# Patient Record
Sex: FEMALE
[De-identification: names, ages, dates, MRNs, and addresses within clinical notes are randomized per-mention and may not be internally consistent; named-entity substitution may affect disease eponyms.]

---

## 2020-02-11 NOTE — PDOC.LDHP
Labor and Delivery H&P


HPI: 


30 y/o  at 40 and 1/7 weeks presents for TOLAC with pitocin induction. R

/B/A of TOLAC carefully discussed in clinic on multiple occasions, including on 

20 in detail.





Current gestational age (weeks): 40


Due date: 20


Grav: 7


Para: 2


Current pregnancy complications: none


Abnormal US findings: No


Current medications: pre-sayda vitamins


Previous surgical history: low tranverse CS


Social history: none





- Physical Exam


Vital signs reviewed and normal: yes


General: NAD


Heart: RRR


Lungs: CTAB


Abdomen: NTTP


Extremeties: no edema


FHT: category 1





- Assessment


L&D Assessment: medically indicated induction





- Plan


Plan: admit to L&D, cervical ripening

## 2020-02-12 ENCOUNTER — HOSPITAL ENCOUNTER (INPATIENT)
Dept: HOSPITAL 92 - L&D | Age: 30
LOS: 2 days | Discharge: HOME | End: 2020-02-14
Attending: OBSTETRICS & GYNECOLOGY | Admitting: OBSTETRICS & GYNECOLOGY
Payer: COMMERCIAL

## 2020-02-12 VITALS — BODY MASS INDEX: 35.4 KG/M2

## 2020-02-12 DIAGNOSIS — Z3A.40: ICD-10-CM

## 2020-02-12 LAB
DRUG SCREEN CUTOFF: (no result)
HBSAG INDEX: 0.21 S/CO (ref 0–0.99)
HGB BLD-MCNC: 12.7 G/DL (ref 12–16)
MCH RBC QN AUTO: 32.2 PG (ref 27–31)
MCV RBC AUTO: 90.8 FL (ref 78–98)
MEDTOX CONTROL LINE VALID?: (no result)
MEDTOX READER #: (no result)
PLATELET # BLD AUTO: 196 THOU/UL (ref 130–400)
RBC # BLD AUTO: 3.94 MILL/UL (ref 4.2–5.4)
SYPHILIS ANTIBODY INDEX: 0.03 S/CO
WBC # BLD AUTO: 9.6 THOU/UL (ref 4.8–10.8)

## 2020-02-12 PROCEDURE — 87340 HEPATITIS B SURFACE AG IA: CPT

## 2020-02-12 PROCEDURE — 86900 BLOOD TYPING SEROLOGIC ABO: CPT

## 2020-02-12 PROCEDURE — 86780 TREPONEMA PALLIDUM: CPT

## 2020-02-12 PROCEDURE — 86901 BLOOD TYPING SEROLOGIC RH(D): CPT

## 2020-02-12 PROCEDURE — 80306 DRUG TEST PRSMV INSTRMNT: CPT

## 2020-02-12 PROCEDURE — 3E033VJ INTRODUCTION OF OTHER HORMONE INTO PERIPHERAL VEIN, PERCUTANEOUS APPROACH: ICD-10-PCS | Performed by: OBSTETRICS & GYNECOLOGY

## 2020-02-12 PROCEDURE — 85027 COMPLETE CBC AUTOMATED: CPT

## 2020-02-12 PROCEDURE — 36415 COLL VENOUS BLD VENIPUNCTURE: CPT

## 2020-02-12 PROCEDURE — 51702 INSERT TEMP BLADDER CATH: CPT

## 2020-02-12 PROCEDURE — 86850 RBC ANTIBODY SCREEN: CPT

## 2020-02-12 RX ADMIN — SODIUM CHLORIDE SCH MLS: 0.9 INJECTION, SOLUTION INTRAVENOUS at 10:35

## 2020-02-12 NOTE — PDOC.EVN
Event Note





- Event Note


Event Note: 





AROM 2/60/-1


FHT 130s mod ltv 


after arom late appearing decels began. position changes, iv bolus.  if 

necessary will cut pit by 50%

## 2020-02-13 RX ADMIN — HYDROCODONE BITARTRATE AND ACETAMINOPHEN PRN TAB: 5; 325 TABLET ORAL at 19:53

## 2020-02-13 RX ADMIN — DOCUSATE CALCIUM SCH MG: 240 CAPSULE, LIQUID FILLED ORAL at 21:29

## 2020-02-13 RX ADMIN — Medication SCH: at 19:16

## 2020-02-13 RX ADMIN — DOCUSATE CALCIUM SCH: 240 CAPSULE, LIQUID FILLED ORAL at 10:35

## 2020-02-13 RX ADMIN — SODIUM CHLORIDE SCH: 0.9 INJECTION, SOLUTION INTRAVENOUS at 13:54

## 2020-02-13 RX ADMIN — Medication SCH: at 10:36

## 2020-02-14 VITALS — SYSTOLIC BLOOD PRESSURE: 128 MMHG | DIASTOLIC BLOOD PRESSURE: 75 MMHG | TEMPERATURE: 97.6 F

## 2020-02-14 LAB
HGB BLD-MCNC: 12.3 G/DL (ref 12–16)
MCH RBC QN AUTO: 31.5 PG (ref 27–31)
MCV RBC AUTO: 92.9 FL (ref 78–98)
PLATELET # BLD AUTO: 192 THOU/UL (ref 130–400)
RBC # BLD AUTO: 3.89 MILL/UL (ref 4.2–5.4)
WBC # BLD AUTO: 14.7 THOU/UL (ref 4.8–10.8)

## 2020-02-14 RX ADMIN — DOCUSATE CALCIUM SCH MG: 240 CAPSULE, LIQUID FILLED ORAL at 09:03

## 2020-02-14 RX ADMIN — HYDROCODONE BITARTRATE AND ACETAMINOPHEN PRN TAB: 5; 325 TABLET ORAL at 09:03

## 2020-02-14 RX ADMIN — Medication SCH: at 09:02

## 2020-02-14 RX ADMIN — HYDROCODONE BITARTRATE AND ACETAMINOPHEN PRN TAB: 5; 325 TABLET ORAL at 17:14
